# Patient Record
Sex: FEMALE | Race: OTHER | URBAN - METROPOLITAN AREA
[De-identification: names, ages, dates, MRNs, and addresses within clinical notes are randomized per-mention and may not be internally consistent; named-entity substitution may affect disease eponyms.]

---

## 2024-07-26 ENCOUNTER — OFFICE VISIT (OUTPATIENT)
Dept: SURGERY | Facility: CLINIC | Age: 49
End: 2024-07-26

## 2024-07-26 DIAGNOSIS — N39.3 STRESS INCONTINENCE (FEMALE) (MALE): ICD-10-CM

## 2024-07-26 DIAGNOSIS — R31.29 MICROHEMATURIA: ICD-10-CM

## 2024-07-26 DIAGNOSIS — N32.81 OAB (OVERACTIVE BLADDER): Primary | ICD-10-CM

## 2024-07-26 DIAGNOSIS — R30.0 DYSURIA: ICD-10-CM

## 2024-07-26 LAB
APPEARANCE: CLEAR
BILIRUBIN: NEGATIVE
GLUCOSE (URINE DIPSTICK): NEGATIVE MG/DL
KETONES (URINE DIPSTICK): NEGATIVE MG/DL
LEUKOCYTES: NEGATIVE
MULTISTIX LOT#: ABNORMAL NUMERIC
NITRITE, URINE: NEGATIVE
PH, URINE: 5.5 (ref 4.5–8)
PROTEIN (URINE DIPSTICK): NEGATIVE MG/DL
SPECIFIC GRAVITY: 1.03 (ref 1–1.03)
URINE-COLOR: YELLOW
UROBILINOGEN,SEMI-QN: 0.2 MG/DL (ref 0–1.9)

## 2024-07-26 PROCEDURE — 99204 OFFICE O/P NEW MOD 45 MIN: CPT

## 2024-07-26 PROCEDURE — 81003 URINALYSIS AUTO W/O SCOPE: CPT

## 2024-07-26 RX ORDER — SOLIFENACIN SUCCINATE 10 MG/1
10 TABLET, FILM COATED ORAL DAILY
Qty: 90 TABLET | Refills: 3 | Status: SHIPPED | OUTPATIENT
Start: 2024-07-26

## 2024-07-26 RX ORDER — FERROUS SULFATE 325(65) MG
325 TABLET ORAL 2 TIMES DAILY WITH MEALS
COMMUNITY
Start: 2023-06-22

## 2024-07-26 NOTE — PROGRESS NOTES
[facility] MEDICAL [facility], [facility]    Urology Consult Note    History of Present Illness:   Patient is a(n) 49 year old female with history of vitamin D deficiency, anemia, HLD, and prediabetes (A1C 6.1 on 2/14/24)  presenting for urinary incontinence.     Patient reports the symptoms started between her 2nd and 3rd pregnancies, however has worsened significantly within the past 2-3 years. She reports leakage with small movements including yelling, standing up, or walking. She previously only had leakage with coughing, sneezing, or laughing. Patient denies dysuria or hematuria. She does report frequency and urgency to void. She reports voiding every 30 min most days. She does report feeling pressure and spasms in her pelvic region at times.     She has a history of constipation. Previously BM every 5-6 days, but currently every other day. Stools are still hard. She is doing daily fiber supplements and this has helped. Drinks 6-8 16oz bottles of water a day. Denies soda, caffeine, or alcohol intake.      She also reports frequent \"utis\" that occur with her menses. She reports burning and pain that will occur a day or two after her period. She reports her PCP recommended topical [place] prn with sx onset for this.     Periods are irregular, every other month. She has only had 3 periods so far this year. Was told by gyne she may be perimenopausal.     HISTORY:  No past medical history on file.   No past surgical history on file.   No family history on file.   Social History:   Social History     Socioeconomic History    Marital status:      Social Determinants of Health     Financial Resource Strain: Not on File (10/7/2022)    Received from [facility]    Financial Resource Strain     Financial Resource Strain: 0   Food Insecurity: Not on File (10/7/2022)    Received from [facility]    Food Insecurity     Food: 0   Transportation Needs: Not on File (10/7/2022)    Received from [facility] [facility]     Transportation Needs     Transportation: 0   Physical Activity: Not on File (10/7/2022)    Received from [facility]    Physical Activity     Physical Activity: 0   Stress: Not on File (10/7/2022)    Received from [facility]    Stress     Stress: 0   Social Connections: Not on File (10/7/2022)    Received from [facility]    Social Connections     Social Connections and Isolation: 0    [facility]        Allergies  No Known Allergies    Review of Systems:   A 10-point review of systems was completed and is negative other than as noted above.    Physical Exam:   There were no vitals taken for this visit.    GENERAL APPEARANCE: no acute distress  NEUROLOGIC: converses appropriately  HEAD: atraumatic, normocephalic  LUNGS: non-labored breathing  ABDOMEN: soft, nontender, non-distended  BACK: no CVA tenderness  PSYCH: appropriate affect and mood    Results:     Laboratory Data:  No results found for: \"WBC\", \"HGB\", \"PLT\"  No results found for: \"NA\", \"K\", \"CL\", \"CO2\", \"BUN\", \"CREATININE\", \"GLU\", \"GFRAA\", \"AST\", \"ALT\", \"TP\", \"ALB\", \"PHOS\", \"CA\", \"MG\"    Urinalysis Results (last 3 years):  Recent Labs     07/26/24  1419   SPECGRAVITY 1.030   PHURINE 5.5   NITRITE Negative       Urine Culture Results (last 3 years):  No results found for: \"URINECUL\"    Imaging  No results found.      Impression:   Recommendations:    Overactive Bladder/Stress Incontinence   - discussed starting vesicare 10 mg daily to help control bladder spasms. Risks and benefits of medication and side effects were discussed in detail  - reinforced importance of preventing constipation d/t side effects. Encouraged miralax daily in addition to fiber supplement and water intake  - discussed timed voiding with goal for voiding q2h, but working up to this time frame  - patient doing Kegels at home, but discussed formal PFT. She would like to wait and trial medication first and start PFT if no significant benefit from medication alone  - RTC in  3mos    Microhematuria  - patient feels she is menstruating at this time, will repeat sample mid-cycle. Orders placed for this    UTI  - unsure if bladder v vaginal infection. Will have patient give urine sample when symptoms occur. Discussed potential treatment options with patient if is recurrent UTI.   Discussed difference between bladder and vaginal infections with patient.     The above assessment and plan were discussed in detail with the patient who verbalized understanding and all questions were answered.    UZIEL Kasper    The above plan was discussed thoroughly with Pilar Cedillo PA-C.     Pilar Cedillo PA-C  Urology  [facility]  Phone: [phone]    Date: 7/26/2024  Time: 2:51 PM

## 2024-12-03 ENCOUNTER — APPOINTMENT (OUTPATIENT)
Dept: CT IMAGING | Age: 49
End: 2024-12-03
Attending: PHYSICIAN ASSISTANT
Payer: MEDICAID

## 2024-12-03 ENCOUNTER — APPOINTMENT (OUTPATIENT)
Dept: ULTRASOUND IMAGING | Age: 49
End: 2024-12-03
Attending: PHYSICIAN ASSISTANT
Payer: MEDICAID

## 2024-12-03 ENCOUNTER — HOSPITAL ENCOUNTER (OUTPATIENT)
Age: 49
Discharge: HOME | End: 2024-12-03
Payer: MEDICAID

## 2024-12-03 VITALS
RESPIRATION RATE: 20 BRPM | OXYGEN SATURATION: 97 % | HEART RATE: 68 BPM | TEMPERATURE: 99 F | SYSTOLIC BLOOD PRESSURE: 117 MMHG | DIASTOLIC BLOOD PRESSURE: 68 MMHG

## 2024-12-03 DIAGNOSIS — R93.89 ABNORMAL FINDING ON CT SCAN: ICD-10-CM

## 2024-12-03 DIAGNOSIS — R10.33 ABDOMINAL PAIN, PERIUMBILICAL: Primary | ICD-10-CM

## 2024-12-03 DIAGNOSIS — R11.2 NAUSEA AND VOMITING IN ADULT: ICD-10-CM

## 2024-12-03 LAB
#MXD IC: 0.2 X10ˆ3/UL (ref 0.1–1)
B-HCG UR QL: NEGATIVE
BILIRUB UR QL STRIP: NEGATIVE
BUN BLD-MCNC: 18 MG/DL (ref 7–18)
CHLORIDE BLD-SCNC: 105 MMOL/L (ref 98–112)
CLARITY UR: CLEAR
CO2 BLD-SCNC: 21 MMOL/L (ref 21–32)
COLOR UR: YELLOW
CREAT BLD-MCNC: 0.5 MG/DL
EGFRCR SERPLBLD CKD-EPI 2021: 116 ML/MIN/1.73M2 (ref 60–?)
GLUCOSE BLD-MCNC: 114 MG/DL (ref 70–99)
GLUCOSE UR STRIP-MCNC: NEGATIVE MG/DL
HCT VFR BLD AUTO: 39.7 %
HCT VFR BLD CALC: 42 %
HGB BLD-MCNC: 12.8 G/DL
ISTAT IONIZED CALCIUM FOR CHEM 8: 1.1 MMOL/L (ref 1.12–1.32)
KETONES UR STRIP-MCNC: NEGATIVE MG/DL
LEUKOCYTE ESTERASE UR QL STRIP: NEGATIVE
LYMPHOCYTES # BLD AUTO: 0.5 X10ˆ3/UL (ref 1–4)
LYMPHOCYTES NFR BLD AUTO: 9.4 %
MCH RBC QN AUTO: 28.6 PG (ref 26–34)
MCHC RBC AUTO-ENTMCNC: 32.2 G/DL (ref 31–37)
MCV RBC AUTO: 88.8 FL (ref 80–100)
MIXED CELL %: 3 %
NEUTROPHILS # BLD AUTO: 5.1 X10ˆ3/UL (ref 1.5–7.7)
NEUTROPHILS NFR BLD AUTO: 87.6 %
NITRITE UR QL STRIP: NEGATIVE
PH UR STRIP: 6 [PH]
PLATELET # BLD AUTO: 288 X10ˆ3/UL (ref 150–450)
POTASSIUM BLD-SCNC: 3.7 MMOL/L (ref 3.6–5.1)
PROT UR STRIP-MCNC: 30 MG/DL
RBC # BLD AUTO: 4.47 X10ˆ6/UL
SODIUM BLD-SCNC: 139 MMOL/L (ref 136–145)
SP GR UR STRIP: >=1.03
UROBILINOGEN UR STRIP-ACNC: <2 MG/DL
WBC # BLD AUTO: 5.8 X10ˆ3/UL (ref 4–11)

## 2024-12-03 PROCEDURE — 80047 BASIC METABLC PNL IONIZED CA: CPT

## 2024-12-03 PROCEDURE — 81002 URINALYSIS NONAUTO W/O SCOPE: CPT

## 2024-12-03 PROCEDURE — 99215 OFFICE O/P EST HI 40 MIN: CPT

## 2024-12-03 PROCEDURE — 74177 CT ABD & PELVIS W/CONTRAST: CPT | Performed by: PHYSICIAN ASSISTANT

## 2024-12-03 PROCEDURE — 81025 URINE PREGNANCY TEST: CPT

## 2024-12-03 PROCEDURE — 99205 OFFICE O/P NEW HI 60 MIN: CPT

## 2024-12-03 PROCEDURE — 76830 TRANSVAGINAL US NON-OB: CPT | Performed by: PHYSICIAN ASSISTANT

## 2024-12-03 PROCEDURE — 76856 US EXAM PELVIC COMPLETE: CPT | Performed by: PHYSICIAN ASSISTANT

## 2024-12-03 PROCEDURE — 96374 THER/PROPH/DIAG INJ IV PUSH: CPT

## 2024-12-03 PROCEDURE — 85025 COMPLETE CBC W/AUTO DIFF WBC: CPT | Performed by: PHYSICIAN ASSISTANT

## 2024-12-03 RX ORDER — ONDANSETRON 4 MG/1
4 TABLET, ORALLY DISINTEGRATING ORAL EVERY 4 HOURS PRN
Qty: 20 TABLET | Refills: 0 | Status: SHIPPED | OUTPATIENT
Start: 2024-12-03

## 2024-12-03 RX ORDER — ONDANSETRON 2 MG/ML
4 INJECTION INTRAMUSCULAR; INTRAVENOUS ONCE
Status: COMPLETED | OUTPATIENT
Start: 2024-12-03 | End: 2024-12-03

## 2024-12-03 NOTE — DISCHARGE INSTRUCTIONS
Please return to the ER/clinic if symptoms worsen. Follow-up with your PCP in 24-48 hours as needed.    Push fluids.  Follow the BRAT dietary plan.  Take the Zofran as prescribed.  If anything changes i.e. increasing abdominal pain fevers etc. go directly to the emergency room.  Otherwise as was discussed in the room contact gynecology for further evaluation and treatment i.e. MRI as radiology recommended.      Empujar fluidos.  Siga el plan dietético BRAT.  [place] Zofran según lo prescrito.  Si algo cambia, es decir, aumento del dolor abdominal, fiebre, etc., vaya directamente a la ashley de emergencias.  De lo contrario, federica se discutió en la ashley, comuníquese con ginecología para sukhwinder evaluación y tratamiento adicionales, es decir, sukhwinder resonancia magnética según lo recomendado por radiología.

## 2024-12-03 NOTE — ED PROVIDER NOTES
Patient Seen in: [facility]      History   No chief complaint on file.    Stated Complaint: Fever    Subjective:   HPI      48 yo female here with complaint of nausea vomiting with periumbilical pain and low-grade fever that started last night.  Patient denies any recent travel or sick contacts.  Patient denies that anyone else in the house is sick.  Patient denies chest pain, shortness of breath, cough.  Patient denies dysuria, hematuria or flank pain.  Temp is 99.3 °F    Objective:     History reviewed. No pertinent past medical history.           History reviewed. No pertinent surgical history.           The patient's medication list, past medical history and social history elements  as listed in today's nurse's notes are reviewed and agree.   The patient's family history is reviewed and is noncontributory to the presenting problem, except as indicated as above.     No pertinent social history.            Review of Systems    Positive for stated complaint: Fever  Other systems are as noted in HPI.  Constitutional and vital signs reviewed.      All other systems reviewed and negative except as noted above.    Physical Exam     ED Triage Vitals [12/03/24 1037]   /68   Pulse 68   Resp 20   Temp 99.3 °F (37.4 °C)   Temp src Oral   SpO2 97 %   O2 Device None (Room air)       Current Vitals:   Vital Signs  BP: 117/68  Pulse: 68  Resp: 20  Temp: 99.3 °F (37.4 °C)  Temp src: Oral    Oxygen Therapy  SpO2: 97 %  O2 Device: None (Room air)        Physical Exam  Vitals and nursing note reviewed.   Constitutional:       Appearance: Normal appearance. She is well-developed.   HENT:      Head: Normocephalic.      Right Ear: External ear normal.      Left Ear: External ear normal.      Nose: Nose normal.      Mouth/Throat:      Mouth: Mucous membranes are moist.   Eyes:      Conjunctiva/sclera: Conjunctivae normal.      Pupils: Pupils are equal, round, and reactive to light.   Cardiovascular:      Rate  and Rhythm: Normal rate and regular rhythm.      Heart sounds: Normal heart sounds.   Pulmonary:      Effort: Pulmonary effort is normal.      Breath sounds: Normal breath sounds.   Abdominal:      General: Abdomen is protuberant. Bowel sounds are normal.      Palpations: Abdomen is soft.      Tenderness: There is abdominal tenderness in the periumbilical area. There is no right CVA tenderness or left CVA tenderness.   Musculoskeletal:      Cervical back: Normal range of motion and neck supple.   Skin:     General: Skin is warm.      Capillary Refill: Capillary refill takes less than 2 seconds.   Neurological:      General: No focal deficit present.      Mental Status: She is alert and oriented to person, place, and time.   Psychiatric:         Mood and Affect: Mood normal.         Behavior: Behavior normal.         Thought Content: Thought content normal.         Judgment: Judgment normal.           ED Course     Labs Reviewed   POCT CBC - Abnormal; Notable for the following components:       Result Value    # Lymphocyte 0.5 (*)     All other components within normal limits   [facility] POCT URINALYSIS DIPSTICK - Abnormal; Notable for the following components:    Protein urine 30 (*)     Blood, Urine Moderate (*)     All other components within normal limits   POCT ISTAT CHEM8 CARTRIDGE - Abnormal; Notable for the following components:    ISTAT Ionized Calcium 1.10 (*)     ISTAT Glucose 114 (*)     ISTAT Creatinine 0.50 (*)     All other components within normal limits   POCT PREGNANCY URINE - Normal     I personally reviewed the xray images and and saw these findings: spoke with radiology who recommended pelvic US and also MRI with gynecology to rule out any cervical cancer      US PELVIS (TRANSABDOMINAL AND TRANSVAGINAL) (CPT=76856/54305)    Result Date: 12/3/2024  PROCEDURE:  US PELVIS (TRANSABDOMINAL AND TRANSVAGINAL) (CPT=76856/79119)  COMPARISON:  JUAN CARLOS LUCAS, CT ABDOMEN+PELVIS(CONTRAST ONLY)(CPT=74177),  12/03/2024, 11:23 AM.  INDICATIONS:  abnormal CT finding of cervix  TECHNIQUE:  Pelvic ultrasound using transabdominal and endovaginal technique.  Transvaginal ultrasound was used to better evaluate adnexal and endometrial detail.  PATIENT STATED HISTORY: (As transcribed by Technologist)     FINDINGS:            UTERUS:  9.5 x 4.8 x 6.2 cm   Endometrium Thickness:  11 mm   The uterus appears normal in size, shape, and echogenicity. RIGHT OVARY:  4.1 x 3.5 x 3.2 cm   The right ovary appears normal in size, shape, and echogenicity.  3.8 x 2.7 x 2.5 cm cyst. LEFT OVARY:  Not visualized despite endovaginal examination due to overlying bowel gas. CUL-DE-SAC:  No free fluid.  OTHER:  Multiple cystic foci are present within the cervix.             CONCLUSION:  1. Right ovarian 3.8 cm cyst.  Recommend follow-up to ensure resolution as clinically indicated. 2. The left ovary is not visualized despite endovaginal examination due to overlying bowel gas. 3. Multiple cystic foci are present within the cervix and correspond to recent CT findings most likely represent nabothian cysts, correlate clinically.   LOCATION:  [facility]   Dictated by (CST): Georgia Wilson MD on 12/03/2024 at 1:35 PM     Finalized by (CST): Georgia Wilson MD on 12/03/2024 at 1:38 PM       CT ABDOMEN+PELVIS(CONTRAST ONLY)(CPT=74177)    Addendum Date: 12/3/2024    CORRECTION Corrected on: 12/03/2024; CORRECTION Corrected on: 12/03/2024;   PROCEDURE:  CT ABDOMEN+PELVIS (CONTRAST ONLY) (CPT=74177)  COMPARISON:  None.  INDICATIONS:  Fever  TECHNIQUE:  CT scanning was performed from the dome of the diaphragm to the pubic symphysis with non-ionic intravenous contrast material. Post contrast coronal MPR imaging was performed.  Dose reduction techniques were used. Dose information is transmitted to the ACR (American College of Radiology) NRDR (National Radiology Data Registry) which includes the Dose Index Registry.  PATIENT STATED HISTORY:(As transcribed by Technologist)   Patient states to have umbilical pain, nausea, vomiting, diarrhea and fever for 1 day.   CONTRAST USED:  80cc of Isovue 370  FINDINGS:  LIVER:  No enlargement, atrophy, abnormal density, or significant focal lesion.  BILIARY:  No visible dilatation or calcification.  PANCREAS:  No lesion, fluid collection, ductal dilatation, or atrophy.  SPLEEN:  No enlargement or focal lesion.  KIDNEYS:  No mass, obstruction, or calcification.  ADRENALS:  No mass or enlargement.  AORTA/VASCULAR:  No aneurysm or dissection.  RETROPERITONEUM:  No mass or adenopathy.  BOWEL/MESENTERY:  No dilated bowel or wall thickening.  Normal appendix. ABDOMINAL WALL:  No mass or hernia.  URINARY BLADDER:  Nondistended PELVIC NODES:  No adenopathy.  PELVIC ORGANS:  Multi cystic/loculated changes involving the cervix.  Approximately 2.2 cm fibroid within the left fundus.  Endometrial stripe measures approximately 11 mm.  Right ovarian cyst measuring 4.7 x 2.4 cm. BONES:  No bony lesion or fracture.  LUNG BASES:  No visible pulmonary or pleural disease.  OTHER:  Negative.   CONCLUSION:  1. Cystic/loculated changes involving the cervix.  While these may represent nabothian cysts, infectious/inflammatory process versus neoplastic process (adenoma malignum) is not excluded, correlate clinically.  Recommend further evaluation with pelvic ultrasound and/or contrast enhanced pelvic MRI.  Consider gynecologic evaluation. 2. Otherwise no acute abnormality in the abdomen or pelvis.  Findings were communicated with Destiny Lima at 11:48 on 12/3/2024.  There was appropriate read back.  LOCATION:  [facility]   Dictated by (CST): Freddy Roberts MD on 12/03/2024 at 11:38 AM     Finalized by (CST): Freddy Roberts MD on 12/03/2024 at 11:44 AM    Dictated by (CST): Freddy Roberts MD on 12/03/2024 at 11:47 AM     Finalized by (CST): Freddy Roberts MD on 12/03/2024 at 11:47 AM     Dictated by (CST): Freddy Roberts MD on 12/03/2024 at 11:49 AM     Finalized by (CST):  Freddy Roberts MD on 12/03/2024 at 11:49 AM              Addendum Date: 12/3/2024    CORRECTION Corrected on: 12/03/2024;   PROCEDURE:  CT ABDOMEN+PELVIS (CONTRAST ONLY) (CPT=74177)  COMPARISON:  None.  INDICATIONS:  Fever  TECHNIQUE:  CT scanning was performed from the dome of the diaphragm to the pubic symphysis with non-ionic intravenous contrast material. Post contrast coronal MPR imaging was performed.  Dose reduction techniques were used. Dose information is transmitted to the ACR (American College of Radiology) NRDR (National Radiology Data Registry) which includes the Dose Index Registry.  PATIENT STATED HISTORY:(As transcribed by Technologist)  Patient states to have umbilical pain, nausea, vomiting, diarrhea and fever for 1 day.   CONTRAST USED:  80cc of Isovue 370  FINDINGS:  LIVER:  No enlargement, atrophy, abnormal density, or significant focal lesion.  BILIARY:  No visible dilatation or calcification.  PANCREAS:  No lesion, fluid collection, ductal dilatation, or atrophy.  SPLEEN:  No enlargement or focal lesion.  KIDNEYS:  No mass, obstruction, or calcification.  ADRENALS:  No mass or enlargement.  AORTA/VASCULAR:  No aneurysm or dissection.  RETROPERITONEUM:  No mass or adenopathy.  BOWEL/MESENTERY:  No dilated bowel or wall thickening.  Normal appendix. ABDOMINAL WALL:  No mass or hernia.  URINARY BLADDER:  Nondistended PELVIC NODES:  No adenopathy.  PELVIC ORGANS:  Multi cystic/loculated changes involving the cervix.  Approximately 2.2 cm fibroid within the left fundus.  Endometrial stripe measures approximately 11 mm.  Right ovarian cyst measuring 4.7 x 2.4 cm. BONES:  No bony lesion or fracture.  LUNG BASES:  No visible pulmonary or pleural disease.  OTHER:  Negative.   CONCLUSION:  1. Cystic/loculated changes involving the cervix.  While these may represent nabothian cysts, infectious/inflammatory process versus neoplastic process (adenoma malignum) is not excluded, correlate clinically.   Recommend further evaluation with pelvic ultrasound and/or contrast enhanced pelvic MRI.  Consider gynecologic evaluation. 2. Otherwise no acute abnormality in the abdomen or pelvis.   LOCATION:  [id number]   Dictated by (CST): Freddy Roberts MD on 12/03/2024 at 11:38 AM     Finalized by (CST): Freddy Roberts MD on 12/03/2024 at 11:44 AM    Dictated by (CST): Freddy Roberts MD on 12/03/2024 at 11:47 AM     Finalized by (CST): Freddy Roberts MD on 12/03/2024 at 11:47 AM              Result Date: 12/3/2024  PROCEDURE:  CT ABDOMEN+PELVIS (CONTRAST ONLY) (CPT=74177)  COMPARISON:  None.  INDICATIONS:  Fever  TECHNIQUE:  CT scanning was performed from the dome of the diaphragm to the pubic symphysis with non-ionic intravenous contrast material. Post contrast coronal MPR imaging was performed.  Dose reduction techniques were used. Dose information is transmitted to the ACR (American College of Radiology) NRDR (National Radiology Data Registry) which includes the Dose Index Registry.  PATIENT STATED HISTORY:(As transcribed by Technologist)  Patient states to have umbilical pain, nausea, vomiting, diarrhea and fever for 1 day.   CONTRAST USED:  80cc of Isovue 370  FINDINGS:  LIVER:  No enlargement, atrophy, abnormal density, or significant focal lesion.  BILIARY:  No visible dilatation or calcification.  PANCREAS:  No lesion, fluid collection, ductal dilatation, or atrophy.  SPLEEN:  No enlargement or focal lesion.  KIDNEYS:  No mass, obstruction, or calcification.  ADRENALS:  No mass or enlargement.  AORTA/VASCULAR:  No aneurysm or dissection.  RETROPERITONEUM:  No mass or adenopathy.  BOWEL/MESENTERY:  No dilated bowel or wall thickening.  Normal appendix. ABDOMINAL WALL:  No mass or hernia.  URINARY BLADDER:  Nondistended PELVIC NODES:  No adenopathy.  PELVIC ORGANS:  Multi cystic/loculated changes involving the cervix.  Approximately 2.2 cm fibroid within the left fundus.  Endometrial stripe measures approximately 11 mm.   Right ovarian cyst measuring 4.7 x 2.4 cm. BONES:  No bony lesion or fracture.  LUNG BASES:  No visible pulmonary or pleural disease.  OTHER:  Negative.             CONCLUSION:  1. Cystic/loculated changes involving the cervix.  While these may represent nabothian cysts, infectious/inflammatory process is not excluded, correlate clinically.  Recommend further evaluation with pelvic ultrasound. 2. Otherwise no acute abnormality in the abdomen or pelvis.   LOCATION:  [facility]   Dictated by (CST): Freddy Roberts MD on 12/03/2024 at 11:38 AM     Finalized by (CST): Freddy Roberts MD on 12/03/2024 at 11:44 AM          NOTE: Bone stated that is most likely nabothian cyst however after speaking with radiology they recommend MRI with gynecology for further evaluation and treatment and to rule out any cervical cancer.  Discussed with the patient and her daughter they will schedule an appointment for follow-up with gynecology.     MDM   Clinical Impression: N/V/abnormal CT findings  Course of Treatment:   Push fluids.  Follow the BRAT dietary plan.  Take the Zofran as prescribed.  If anything changes i.e. increasing abdominal pain fevers etc. go directly to the emergency room.  Otherwise as was discussed in the room contact gynecology for further evaluation and treatment i.e. MRI as radiology recommended.  This case was discussed with the attending physician         The patient is encouraged to return if any concerning symptoms arise. Additional verbal discharge instructions are given and discussed. Discharge medications are discussed. The patient is in good condition throughout the visit today and remains so upon discharge. I discuss the plan of care with the patient, who expresses understanding. All questions and concerns are addressed to the patient's satisfaction prior to discharge today.  Previous conversations with PCP and charts were reviewed.                Disposition and Plan     Clinical Impression:  1.  Abdominal pain, periumbilical    2. Nausea and vomiting in adult    3. Abnormal finding on CT scan         Disposition:  Discharge  12/3/2024  1:48 pm    Follow-up:  Anna Melo MD  [address] [address]  [phone]                Medications Prescribed:  Current Discharge Medication List        START taking these medications    Details   ondansetron 4 MG Oral Tablet Dispersible Take 1 tablet (4 mg total) by mouth every 4 (four) hours as needed.  Qty: 20 tablet, Refills: 0                 Supplementary Documentation: